# Patient Record
Sex: FEMALE | Race: WHITE | NOT HISPANIC OR LATINO | Employment: FULL TIME | URBAN - METROPOLITAN AREA
[De-identification: names, ages, dates, MRNs, and addresses within clinical notes are randomized per-mention and may not be internally consistent; named-entity substitution may affect disease eponyms.]

---

## 2020-07-31 ENCOUNTER — VIRTUAL VISIT (OUTPATIENT)
Dept: INTERNAL MEDICINE | Facility: CLINIC | Age: 30
End: 2020-07-31
Payer: COMMERCIAL

## 2020-07-31 DIAGNOSIS — Z86.16 HISTORY OF 2019 NOVEL CORONAVIRUS DISEASE (COVID-19): ICD-10-CM

## 2020-07-31 DIAGNOSIS — R05.9 COUGH: Primary | ICD-10-CM

## 2020-07-31 PROCEDURE — 99203 OFFICE O/P NEW LOW 30 MIN: CPT | Mod: TEL | Performed by: INTERNAL MEDICINE

## 2020-07-31 RX ORDER — ALBUTEROL SULFATE 90 UG/1
2 AEROSOL, METERED RESPIRATORY (INHALATION) EVERY 6 HOURS
Qty: 1 INHALER | Refills: 3 | Status: SHIPPED | OUTPATIENT
Start: 2020-07-31

## 2020-07-31 NOTE — PROGRESS NOTES
"Hansa Monet is a 30 year old female who is being evaluated via a billable telephone visit.      The patient has been notified of following:     \"This telephone visit will be conducted via a call between you and your physician/provider. We have found that certain health care needs can be provided without the need for a physical exam.  This service lets us provide the care you need with a short phone conversation.  If a prescription is necessary we can send it directly to your pharmacy.  If lab work is needed we can place an order for that and you can then stop by our lab to have the test done at a later time.    Telephone visits are billed at different rates depending on your insurance coverage. During this emergency period, for some insurers they may be billed the same as an in-person visit.  Please reach out to your insurance provider with any questions.    If during the course of the call the physician/provider feels a telephone visit is not appropriate, you will not be charged for this service.\"    Patient has given verbal consent for Telephone visit?  Yes    What phone number would you like to be contacted at? [phone]    How would you like to obtain your AVS? Mail a copy    Subjective     Hansa Monet is a 30 year old female who presents via phone visit today for the following health issues:    HPI    Still having symptoms from covid      Duration: 04/18/2020    Description (location/character/radiation): was positive for Covid    Intensity:  mild, moderate    Accompanying signs and symptoms: Right lung has a heavy feeling and makes her start coughing.     History (similar episodes/previous evaluation): None    Precipitating or alleviating factors: None    Therapies tried and outcome: None            As above.  Patient tested positive for COVID at outside institution role 14th of this year, after presenting with a few days of mild fever, mild sore throat, and cough.  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    She continues to complain of intermittent cough, and mild wheezing.  She has a sensation that her right lung has a \"heavy\" feeling, and occasionally hears rattling and wheezing from that side, which seems to be positional.  She is not taking any over-the-counter medications to speak of to help with the symptoms.    No current fever.  No current sinus congestion or postnasal drip.      Reviewed and updated as needed this visit by Provider         Review of Systems   Constitutional, HEENT, cardiovascular, pulmonary, GI, , musculoskeletal, neuro, skin, endocrine and psych systems are negative, except as otherwise noted.       Objective   Reported vitals:  There were no vitals taken for this visit.   healthy, alert and no distress  PSYCH: Alert and oriented times 3; coherent speech, normal   rate and volume, able to articulate logical thoughts, able   to abstract reason, no tangential thoughts, no hallucinations   or delusions  Her affect is normal  RESP: No cough, no audible wheezing, able to talk in full sentences  Remainder of exam unable to be completed due to telephone visits    Diagnostic Test Results:  Labs reviewed in Epic        Assessment/Plan:    1. Cough  No evidence of bacterial infection at present, but given duration of symptoms and unilaterality, will check chest x-ray in the next few days.  In the meantime offered albuterol to use as needed for symptom control.  - albuterol (PROAIR HFA/PROVENTIL HFA/VENTOLIN HFA) 108 (90 Base) MCG/ACT inhaler; Inhale 2 puffs into the lungs every 6 hours  Dispense: 1 Inhaler; Refill: 3  - XR Chest 2 Views; Future    2. History of 2019 novel coronavirus disease (COVID-19)  As above      Return in about 1 day (around 8/1/2020) for chest x-ray.      Phone call duration:  6 minutes    Douglas Mejia MD        "

## 2020-08-01 DIAGNOSIS — R05.9 COUGH: ICD-10-CM

## 2020-08-01 PROCEDURE — 71046 X-RAY EXAM CHEST 2 VIEWS: CPT

## 2021-01-04 ENCOUNTER — HEALTH MAINTENANCE LETTER (OUTPATIENT)
Age: 31
End: 2021-01-04

## 2021-10-11 ENCOUNTER — HEALTH MAINTENANCE LETTER (OUTPATIENT)
Age: 31
End: 2021-10-11

## 2022-01-30 ENCOUNTER — HEALTH MAINTENANCE LETTER (OUTPATIENT)
Age: 32
End: 2022-01-30

## 2022-03-26 ENCOUNTER — NURSE TRIAGE (OUTPATIENT)
Dept: NURSING | Facility: CLINIC | Age: 32
End: 2022-03-26
Payer: COMMERCIAL

## 2022-03-26 NOTE — TELEPHONE ENCOUNTER
"S-(situation): rectal bleeding      B-(background): regular BM  Nauseous all week, started new medications - duloxetine and naproxen PRN     A-(assessment):   Blood clot passed from rectal area. Turned toilet water red. Bright red blood when she wiped.  No rectal pain  A little pressure on abdomen - no abdominal pain  No constipation concerns  No rectal prolapse  No dizziness  No weakness  Does not feel like there is active rectal bleeding.  Pt had to use bathroom during call and states that she did not pass stools, no bleeding observed.      R-(recommendations): Home care. Call back if rectal bleeding happens twice in a day. Pt verbalized understanding.    Georgette Floyd RN/[facility] Nurse Advisor          Reason for Disposition    Rectal bleeding is minimal (e.g., blood just on toilet paper, a few drops in toilet bowl)    Additional Information    Negative: Shock suspected (e.g., cold/pale/clammy skin, too weak to stand, low BP, rapid pulse)    Negative: Difficult to awaken or acting confused (e.g., disoriented, slurred speech)    Negative: Passed out (i.e., lost consciousness, collapsed and was not responding)    Negative: [1] Vomiting AND [2] contains red blood or black (\"coffee ground\") material  (Exception: few red streaks in vomit that only happened once)    Negative: Sounds like a life-threatening emergency to the triager    Negative: SEVERE rectal bleeding (large blood clots; on and off, or constant bleeding)    Negative: SEVERE dizziness (e.g., unable to stand, requires support to walk, feels like passing out now)    Negative: [1] MODERATE rectal bleeding (small blood clots, passing blood without stool, or toilet water turns red) AND [2] more than once a day    Negative: Pale skin (pallor) of new onset or worsening    Negative: Tarry or jet black-colored stool (not dark green)    Negative: [1] Constant abdominal pain AND [2] present > 2 hours    Negative: Rectal foreign body (i.e., now or within past week; "  inserted or swallowed)    Negative: High-risk adult (e.g., prior surgery on aorta, abdominal aortic aneurysm)    Negative: Taking Coumadin (warfarin) or other strong blood thinner, or known bleeding disorder (e.g., thrombocytopenia)    Negative: Known cirrhosis of the liver (or history of liver failure or ascites)    Negative: [1] Colonoscopy AND [2] in past 72 hours    Negative: Patient sounds very sick or weak to the triager    Negative: MODERATE rectal bleeding (small blood clots, passing blood without stool, or toilet water turns red)    Negative: MILD rectal bleeding (more than just a few drops or streaks)    Negative: Cancer of rectum or intestines (colon)    Negative: Radiation therapy to lower abdomen or pelvis    Negative: [1] Rectal bleeding is minimal (e.g., blood just on toilet paper, few drops, streaks on surface of normal formed BM) AND [2] bleeding recurs 3 or more times on treatment    Negative: Rectal bleeding is a chronic symptom (recurrent or ongoing AND present > 4 weeks)    Negative: Age > 50 years    Negative: Family history of cancer of intestines    Negative: NO physician examination for rectal bleeding in past year    Negative: [1] Normal formed BM AND [2] few streaks or drops of blood on surface of BM    Protocols used: RECTAL BLEEDING-A-AH

## 2022-09-24 ENCOUNTER — HEALTH MAINTENANCE LETTER (OUTPATIENT)
Age: 32
End: 2022-09-24

## 2023-05-08 ENCOUNTER — HEALTH MAINTENANCE LETTER (OUTPATIENT)
Age: 33
End: 2023-05-08